# Patient Record
Sex: MALE | Race: BLACK OR AFRICAN AMERICAN | Employment: FULL TIME | URBAN - NONMETROPOLITAN AREA
[De-identification: names, ages, dates, MRNs, and addresses within clinical notes are randomized per-mention and may not be internally consistent; named-entity substitution may affect disease eponyms.]

---

## 2020-05-11 ENCOUNTER — HOSPITAL ENCOUNTER (EMERGENCY)
Age: 25
Discharge: HOME | End: 2020-05-11
Attending: EMERGENCY MEDICINE
Payer: COMMERCIAL

## 2020-05-11 ENCOUNTER — APPOINTMENT (OUTPATIENT)
Dept: GENERAL RADIOLOGY | Age: 25
End: 2020-05-11
Payer: COMMERCIAL

## 2020-05-11 VITALS
HEIGHT: 72 IN | WEIGHT: 315 LBS | SYSTOLIC BLOOD PRESSURE: 143 MMHG | DIASTOLIC BLOOD PRESSURE: 80 MMHG | OXYGEN SATURATION: 96 % | HEART RATE: 81 BPM | BODY MASS INDEX: 42.66 KG/M2 | RESPIRATION RATE: 16 BRPM

## 2020-05-11 PROCEDURE — 73140 X-RAY EXAM OF FINGER(S): CPT

## 2020-05-11 PROCEDURE — 29130 APPL FINGER SPLINT STATIC: CPT

## 2020-05-11 PROCEDURE — 99283 EMERGENCY DEPT VISIT LOW MDM: CPT

## 2020-05-11 ASSESSMENT — PAIN SCALES - GENERAL: PAINLEVEL_OUTOF10: 3

## 2020-05-11 ASSESSMENT — PAIN DESCRIPTION - ORIENTATION: ORIENTATION: RIGHT;MID

## 2020-05-11 ASSESSMENT — PAIN DESCRIPTION - PAIN TYPE: TYPE: ACUTE PAIN

## 2020-05-11 ASSESSMENT — PAIN DESCRIPTION - LOCATION: LOCATION: FINGER (COMMENT WHICH ONE)

## 2020-05-11 NOTE — ED PROVIDER NOTES
EMERGENCY DEPARTMENT ENCOUNTER     CHIEF COMPLAINT   Chief Complaint   Patient presents with    Hand Pain     Right middle finger, onset PTA after closing hand in truck door        HPI   Alejandra Castillo is a 22 y.o. male who presents with an injury to the right middle finger, caused by closing his finger in the car door, that occurred this morning, perhaps 45 minutes ago. The duration of the pain has been constant since the onset. The pain is a 3  /10. Associated with this injury, the patient has complaint of pain when flexing the finger. REVIEW OF SYSTEMS   Musculoskeletal: + middle finger on right hand pain, no other bony or joint injury   Skin: No lacerations or redness  Neurologic: No numbness or focal extremity weakness      PAST MEDICAL & SURGICAL HISTORY   No past medical history on file. Past Surgical History:   Procedure Laterality Date    TONSILLECTOMY          CURRENT MEDICATIONS   Current Outpatient Rx   Medication Sig Dispense Refill    topiramate (TOPAMAX) 25 MG tablet Take 0.5 tablets by mouth 2 times daily for 30 days. 30 tablet 3    amphetamine-dextroamphetamine (ADDERALL, 15MG,) 15 MG tablet Take 1 tablet by mouth daily. May refill 11/3/13. 30 tablet 0    Coenzyme Q10 100 MG CAPS Take 3 capsules by mouth daily. 90 capsule 2    vitamin (B-2) 100 MG TABS tablet Take 4 tablets by mouth daily. 120 tablet 3    ondansetron (ZOFRAN) 4 MG tablet Take 1 tablet by mouth every 8 hours as needed for Nausea. 20 tablet 3    naproxen (NAPROSYN) 250 MG tablet Take 1 tablet by mouth 2 times daily as needed (headaches).  20 tablet 3        ALLERGIES   Allergies   Allergen Reactions    Seasonal      Watery eyes, sneezing        SOCIAL & FAMILY HISTORY   Social History     Socioeconomic History    Marital status: Single     Spouse name: Not on file    Number of children: Not on file    Years of education: Not on file    Highest education level: Not on file   Occupational History    Not on file   Social Needs    Financial resource strain: Not on file    Food insecurity     Worry: Not on file     Inability: Not on file    Transportation needs     Medical: Not on file     Non-medical: Not on file   Tobacco Use    Smoking status: Never Smoker    Smokeless tobacco: Never Used   Substance and Sexual Activity    Alcohol use: Not on file    Drug use: Not on file    Sexual activity: Not on file   Lifestyle    Physical activity     Days per week: Not on file     Minutes per session: Not on file    Stress: Not on file   Relationships    Social connections     Talks on phone: Not on file     Gets together: Not on file     Attends Confucianist service: Not on file     Active member of club or organization: Not on file     Attends meetings of clubs or organizations: Not on file     Relationship status: Not on file    Intimate partner violence     Fear of current or ex partner: Not on file     Emotionally abused: Not on file     Physically abused: Not on file     Forced sexual activity: Not on file   Other Topics Concern    Not on file   Social History Narrative    Not on file     No family history on file. PHYSICAL EXAM   VITAL SIGNS: BP (!) 143/80   Pulse 81   Resp 18   Ht 6' (1.829 m)   Wt (!) 367 lb (166.5 kg)   SpO2 96%   BMI 49.77 kg/m²   Constitutional: Well developed, well nourished  HENT: Atraumatic, airway patent  NECK: Supple   Respiratory: No respiratory distress, no retractions  Cardiovascular: No JVD  Musculoskeletal: no gross finger swelling to right hand   Vascular: finger warm and well perfused  Integument: Well hydrated, no rash   Neurologic: Awake alert, normal flow ofspeech   Psychiatric: Cooperative, pleasant affect     RADIOLOGY/PROCEDURES   XR FINGER RIGHT (MIN 2 VIEWS)   Final Result   No evidence for acute fracture or dislocation of the 3rd digit. ED COURSE & MEDICAL DECISION MAKING   Pertinent Imaging studies reviewed and interpreted.  (See chart for details) Differential diagnosis: includes but not limited to Arterial Injury/Ischemia, Fracture, Dislocation, Compartment Syndrome, Neurologic Deficit/Injury. MDM: finger well appearing, pt likely contused his finger. Xray negative for fracture    FINAL IMPRESSION   1.  Contusion of right middle finger without damage to nail, initial encounter        PLAN:   Outpatient treatment, follow-up, and discharge instructions (see EMR)    Electronically signed by: Rony Quintana MD, 5/11/2020 7:37 AM          Rony Quintana MD  05/11/20 [id number]